# Patient Record
Sex: FEMALE | Race: ASIAN | Employment: OTHER | URBAN - METROPOLITAN AREA
[De-identification: names, ages, dates, MRNs, and addresses within clinical notes are randomized per-mention and may not be internally consistent; named-entity substitution may affect disease eponyms.]

---

## 2017-11-30 PROBLEM — M85.80 OSTEOPENIA, UNSPECIFIED LOCATION: Status: ACTIVE | Noted: 2017-11-30

## 2017-12-05 PROCEDURE — 81003 URINALYSIS AUTO W/O SCOPE: CPT | Performed by: INTERNAL MEDICINE

## 2019-02-07 ENCOUNTER — APPOINTMENT (OUTPATIENT)
Dept: GENERAL RADIOLOGY | Facility: HOSPITAL | Age: 69
DRG: 087 | End: 2019-02-07
Attending: EMERGENCY MEDICINE
Payer: MEDICARE

## 2019-02-07 ENCOUNTER — APPOINTMENT (OUTPATIENT)
Dept: CT IMAGING | Facility: HOSPITAL | Age: 69
DRG: 087 | End: 2019-02-07
Attending: EMERGENCY MEDICINE
Payer: MEDICARE

## 2019-02-07 ENCOUNTER — HOSPITAL ENCOUNTER (INPATIENT)
Facility: HOSPITAL | Age: 69
LOS: 1 days | Discharge: HOME | DRG: 087 | End: 2019-02-08
Attending: EMERGENCY MEDICINE | Admitting: HOSPITALIST
Payer: MEDICARE

## 2019-02-07 DIAGNOSIS — S09.90XA INJURY OF HEAD, INITIAL ENCOUNTER: ICD-10-CM

## 2019-02-07 DIAGNOSIS — S06.5X9A ACUTE SUBDURAL HEMATOMA (HCC): Primary | ICD-10-CM

## 2019-02-07 LAB
ALBUMIN SERPL-MCNC: 3.7 G/DL (ref 3.1–4.5)
ALBUMIN/GLOB SERPL: 1.2 {RATIO} (ref 1–2)
ALP LIVER SERPL-CCNC: 110 U/L (ref 55–142)
ALT SERPL-CCNC: 36 U/L (ref 14–54)
ANION GAP SERPL CALC-SCNC: 7 MMOL/L (ref 0–18)
APTT PPP: 33.8 SECONDS (ref 26.1–34.6)
AST SERPL-CCNC: 32 U/L (ref 15–41)
ATRIAL RATE: 74 BPM
BASOPHILS # BLD AUTO: 0.06 X10(3) UL (ref 0–0.2)
BASOPHILS NFR BLD AUTO: 0.9 %
BILIRUB SERPL-MCNC: 0.6 MG/DL (ref 0.1–2)
BUN BLD-MCNC: 11 MG/DL (ref 8–20)
BUN/CREAT SERPL: 18.6 (ref 10–20)
CALCIUM BLD-MCNC: 8.3 MG/DL (ref 8.3–10.3)
CHLORIDE SERPL-SCNC: 102 MMOL/L (ref 101–111)
CO2 SERPL-SCNC: 30 MMOL/L (ref 22–32)
CREAT BLD-MCNC: 0.59 MG/DL (ref 0.55–1.02)
DEPRECATED RDW RBC AUTO: 38.8 FL (ref 35.1–46.3)
EOSINOPHIL # BLD AUTO: 0.09 X10(3) UL (ref 0–0.7)
EOSINOPHIL NFR BLD AUTO: 1.3 %
ERYTHROCYTE [DISTWIDTH] IN BLOOD BY AUTOMATED COUNT: 11.8 % (ref 11–15)
GLOBULIN PLAS-MCNC: 3.1 G/DL (ref 2.8–4.4)
GLUCOSE BLD-MCNC: 117 MG/DL (ref 70–99)
HCT VFR BLD AUTO: 41.9 % (ref 35–48)
HGB BLD-MCNC: 14.2 G/DL (ref 12–16)
IMM GRANULOCYTES # BLD AUTO: 0.03 X10(3) UL (ref 0–1)
IMM GRANULOCYTES NFR BLD: 0.4 %
INR BLD: 0.93 (ref 0.9–1.1)
LYMPHOCYTES # BLD AUTO: 1.77 X10(3) UL (ref 1–4)
LYMPHOCYTES NFR BLD AUTO: 25.2 %
M PROTEIN MFR SERPL ELPH: 6.8 G/DL (ref 6.4–8.2)
MCH RBC QN AUTO: 30.5 PG (ref 26–34)
MCHC RBC AUTO-ENTMCNC: 33.9 G/DL (ref 31–37)
MCV RBC AUTO: 89.9 FL (ref 80–100)
MONOCYTES # BLD AUTO: 0.41 X10(3) UL (ref 0.1–1)
MONOCYTES NFR BLD AUTO: 5.8 %
NEUTROPHILS # BLD AUTO: 4.67 X10 (3) UL (ref 1.5–7.7)
NEUTROPHILS # BLD AUTO: 4.67 X10(3) UL (ref 1.5–7.7)
NEUTROPHILS NFR BLD AUTO: 66.4 %
OSMOLALITY SERPL CALC.SUM OF ELEC: 288 MOSM/KG (ref 275–295)
P AXIS: 63 DEGREES
P-R INTERVAL: 152 MS
PLATELET # BLD AUTO: 350 10(3)UL (ref 150–450)
POTASSIUM SERPL-SCNC: 3.2 MMOL/L (ref 3.6–5.1)
PSA SERPL DL<=0.01 NG/ML-MCNC: 12.9 SECONDS (ref 12.4–14.7)
Q-T INTERVAL: 386 MS
QRS DURATION: 78 MS
QTC CALCULATION (BEZET): 428 MS
R AXIS: 89 DEGREES
RBC # BLD AUTO: 4.66 X10(6)UL (ref 3.8–5.3)
SODIUM SERPL-SCNC: 139 MMOL/L (ref 136–144)
T AXIS: 72 DEGREES
TROPONIN I SERPL-MCNC: <0.046 NG/ML (ref ?–0.05)
TSI SER-ACNC: 1.23 MIU/ML (ref 0.35–5.5)
VENTRICULAR RATE: 74 BPM
WBC # BLD AUTO: 7 X10(3) UL (ref 4–11)

## 2019-02-07 PROCEDURE — 99291 CRITICAL CARE FIRST HOUR: CPT | Performed by: INTERNAL MEDICINE

## 2019-02-07 PROCEDURE — 95816 EEG AWAKE AND DROWSY: CPT | Performed by: OTHER

## 2019-02-07 PROCEDURE — 99221 1ST HOSP IP/OBS SF/LOW 40: CPT | Performed by: NEUROLOGICAL SURGERY

## 2019-02-07 PROCEDURE — 70450 CT HEAD/BRAIN W/O DYE: CPT | Performed by: EMERGENCY MEDICINE

## 2019-02-07 PROCEDURE — 71045 X-RAY EXAM CHEST 1 VIEW: CPT | Performed by: EMERGENCY MEDICINE

## 2019-02-07 PROCEDURE — 72125 CT NECK SPINE W/O DYE: CPT | Performed by: EMERGENCY MEDICINE

## 2019-02-07 RX ORDER — ONDANSETRON 2 MG/ML
4 INJECTION INTRAMUSCULAR; INTRAVENOUS EVERY 4 HOURS PRN
Status: DISCONTINUED | OUTPATIENT
Start: 2019-02-07 | End: 2019-02-08

## 2019-02-07 RX ORDER — SODIUM CHLORIDE 9 MG/ML
INJECTION, SOLUTION INTRAVENOUS CONTINUOUS
Status: ACTIVE | OUTPATIENT
Start: 2019-02-07 | End: 2019-02-07

## 2019-02-07 RX ORDER — ACETAMINOPHEN 500 MG
500 TABLET ORAL EVERY 6 HOURS PRN
Status: DISCONTINUED | OUTPATIENT
Start: 2019-02-07 | End: 2019-02-08

## 2019-02-07 RX ORDER — LEVETIRACETAM 500 MG/1
500 TABLET ORAL 2 TIMES DAILY
Status: DISCONTINUED | OUTPATIENT
Start: 2019-02-07 | End: 2019-02-08

## 2019-02-07 RX ORDER — ACETAMINOPHEN 500 MG
1000 TABLET ORAL EVERY 6 HOURS PRN
Status: DISCONTINUED | OUTPATIENT
Start: 2019-02-07 | End: 2019-02-08

## 2019-02-07 RX ORDER — ONDANSETRON 2 MG/ML
4 INJECTION INTRAMUSCULAR; INTRAVENOUS ONCE
Status: COMPLETED | OUTPATIENT
Start: 2019-02-07 | End: 2019-02-07

## 2019-02-07 NOTE — CONSULTS
Sarah  Neurocritical Care Consult Note    Vinny Villagomez Patient Status:  Inpatient    1950 MRN [id number]   [facility] 6NE-A Attending Lang Khanna MD   Hosp Day # 0 PCP Annie Singleton MD       Reason for L2 fracture complicated by pneumonia   • Cancer Mother         breast cancer   • Breast Cancer Mother         dx age- early 29's   • Arthritis Sister         rheumatoid arthritis       Current Meds:    Current Facility-Administered Medications:  0.9 used. Dose information is transmitted to the ACR ([facility]) [address]) which includes the Dose Index Registry.   PATIENT STATED HISTORY: (As transcribed by Technologist)  The patient fell backwards down 5 ce stairs at [employer]. She doesn't remember what happened. FINDINGS: Unremarkable alignment of the cervical spine. Vertebral body heights are maintained throughout the cervical spine. Disc spaces are maintained throughout the cervical spine.  No evidence leukocytosis  Endocrine:  Monitor glucose, sliding scale insulin prn  DVT Prophylaxis:  SCD’s    Goals of the Day: neurochecks, hct, eeg   A total of 45 minutes of critical care time (exclusive of billable procedures) was administered to manage and/or prev

## 2019-02-07 NOTE — CONSULTS
[facility]  Neurosurgery Consult    Kika [place] Patient Status:  Inpatient    1950 MRN [id number]   [facility] 6NE-A Attending Emilie Beltran MD   Hosp Day # 0 PCP Eva Anguiano MD     REASON FOR CONSULTATION:  SDH    HISTOR SIGNS: /62   Pulse 82   Temp 98.8 °F (37.1 °C) (Temporal)   Resp 18   Ht 5' 4\" (1.626 m)   Wt 120 lb (54.4 kg)   SpO2 100%   BMI 20.60 kg/m²   GENERAL:  Patient is a 78year old female in no acute distress. HEENT:  Normocephalic, atraumatic.   NEURO year old female with a left SDH after a fall    Plan:  Discussed with  on rounds  Repeat CT in 12 hours or sooner if pt clinically declines  Q 1 neuro checks for now  No aspirin or anticoagulation until cleared by neurosurgery  PT/OT consults

## 2019-02-07 NOTE — PROCEDURES
[facility] - ELECTROENCEPHALOGRAM (EEG) REPORT  Patient Name:  Donna Wilson   MRN / CSN:  [id number] / [id number]   Date of Birth / Age:  1/18/1950 /  78year old   Encounter Date:  2/7/19         METHODS:  Twenty-two electrodes were applied according to the 10-20

## 2019-02-07 NOTE — ED PROVIDER NOTES
Patient Seen in: [facility] Emergency Department    History   Patient presents with:  Fall (musculoskeletal, neurologic)    Stated Complaint: fall    HPI    39-year-old female comes to the hospital with a complaint of having fallen at [employer]. kg   SpO2 96%   BMI 20.60 kg/m²         Physical Exam    HEENT : NCAT, EOMI, PEERL, throat clear, neck in c-collar but no significant tenderness noted, no JVD, trachea midline, No LAD  Heart: S1S2 normal. No murmurs, regular rate and rhythm, nontender  [facility] Data Registry) which includes the Dose Index Registry. PATIENT STATED HISTORY: (As transcribed by Technologist)  The patient fell backwards down 5 cement stairs at [facility]. She has a bump on the back of her head.  She doesn't remember what ha the cervical spine. Vertebral body heights are maintained throughout the cervical spine. Disc spaces are maintained throughout the cervical spine. No evidence of fracture or dislocation. Prevertebral soft tissues are within normal limits.  Predental space i further care for her injury as above as well as correction of her potassium levels  Admission disposition: 2/7/2019 10:15 AM                 Disposition and Plan     Clinical Impression:  Acute subdural hematoma ([address]  (primary encounter diagnosis)  Injury

## 2019-02-07 NOTE — H&P
[facility] Hospitalist History and Physical      CC: Fall, SDH    PCP: Soila Minor MD    History of Present Illness: Patient is a 78year old female with PMH sig for HL not on medication presents after fall on the stairs at [employer] today.  She reports that edema  Skin: Skin warm and dry. No rashes or lesions. Psych: AAO x 3, with appropriate affect  Neuro: no focal neuro deficits on exam        Data Review:    Labs:   Lab Results   Component Value Date    WBC 7.0 02/07/2019    HGB 14.2 02/07/2019    HCT 41. the orbits are unremarkable. IMPRESSION:  4 mm subdural hemorrhage surrounding the left cerebral hemisphere without significant mass effect is noted. There may be minimal subarachnoid hemorrhage as well. No adjacent fracture is noted.      This critical

## 2019-02-08 ENCOUNTER — APPOINTMENT (OUTPATIENT)
Dept: CV DIAGNOSTICS | Facility: HOSPITAL | Age: 69
DRG: 087 | End: 2019-02-08
Attending: HOSPITALIST
Payer: MEDICARE

## 2019-02-08 VITALS
WEIGHT: 119.06 LBS | HEART RATE: 83 BPM | BODY MASS INDEX: 20.32 KG/M2 | TEMPERATURE: 98 F | OXYGEN SATURATION: 96 % | DIASTOLIC BLOOD PRESSURE: 65 MMHG | SYSTOLIC BLOOD PRESSURE: 113 MMHG | HEIGHT: 64 IN | RESPIRATION RATE: 21 BRPM

## 2019-02-08 LAB
ANION GAP SERPL CALC-SCNC: 7 MMOL/L (ref 0–18)
BASOPHILS # BLD AUTO: 0.05 X10(3) UL (ref 0–0.2)
BASOPHILS NFR BLD AUTO: 0.6 %
BUN BLD-MCNC: 8 MG/DL (ref 8–20)
BUN/CREAT SERPL: 15.4 (ref 10–20)
CALCIUM BLD-MCNC: 8.3 MG/DL (ref 8.3–10.3)
CHLORIDE SERPL-SCNC: 108 MMOL/L (ref 101–111)
CO2 SERPL-SCNC: 27 MMOL/L (ref 22–32)
CREAT BLD-MCNC: 0.52 MG/DL (ref 0.55–1.02)
DEPRECATED RDW RBC AUTO: 39.6 FL (ref 35.1–46.3)
EOSINOPHIL # BLD AUTO: 0.06 X10(3) UL (ref 0–0.7)
EOSINOPHIL NFR BLD AUTO: 0.7 %
ERYTHROCYTE [DISTWIDTH] IN BLOOD BY AUTOMATED COUNT: 11.9 % (ref 11–15)
GLUCOSE BLD-MCNC: 98 MG/DL (ref 70–99)
HCT VFR BLD AUTO: 39.2 % (ref 35–48)
HGB BLD-MCNC: 13.6 G/DL (ref 12–16)
IMM GRANULOCYTES # BLD AUTO: 0.02 X10(3) UL (ref 0–1)
IMM GRANULOCYTES NFR BLD: 0.2 %
LYMPHOCYTES # BLD AUTO: 1.25 X10(3) UL (ref 1–4)
LYMPHOCYTES NFR BLD AUTO: 14.6 %
MCH RBC QN AUTO: 31.4 PG (ref 26–34)
MCHC RBC AUTO-ENTMCNC: 34.7 G/DL (ref 31–37)
MCV RBC AUTO: 90.5 FL (ref 80–100)
MONOCYTES # BLD AUTO: 0.53 X10(3) UL (ref 0.1–1)
MONOCYTES NFR BLD AUTO: 6.2 %
NEUTROPHILS # BLD AUTO: 6.66 X10 (3) UL (ref 1.5–7.7)
NEUTROPHILS # BLD AUTO: 6.66 X10(3) UL (ref 1.5–7.7)
NEUTROPHILS NFR BLD AUTO: 77.7 %
OSMOLALITY SERPL CALC.SUM OF ELEC: 292 MOSM/KG (ref 275–295)
PLATELET # BLD AUTO: 292 10(3)UL (ref 150–450)
POTASSIUM SERPL-SCNC: 3.7 MMOL/L (ref 3.6–5.1)
RBC # BLD AUTO: 4.33 X10(6)UL (ref 3.8–5.3)
SODIUM SERPL-SCNC: 142 MMOL/L (ref 136–144)
WBC # BLD AUTO: 8.6 X10(3) UL (ref 4–11)

## 2019-02-08 PROCEDURE — 99231 SBSQ HOSP IP/OBS SF/LOW 25: CPT | Performed by: NEUROLOGICAL SURGERY

## 2019-02-08 PROCEDURE — 93306 TTE W/DOPPLER COMPLETE: CPT | Performed by: HOSPITALIST

## 2019-02-08 PROCEDURE — 99291 CRITICAL CARE FIRST HOUR: CPT | Performed by: INTERNAL MEDICINE

## 2019-02-08 RX ORDER — LEVETIRACETAM 500 MG/1
500 TABLET ORAL 2 TIMES DAILY
Qty: 12 TABLET | Refills: 0 | Status: SHIPPED | OUTPATIENT
Start: 2019-02-08 | End: 2019-02-14

## 2019-02-08 NOTE — DISCHARGE SUMMARY
General Medicine Discharge Summary     Patient ID:  Renate Gamble  78year old  1/18/1950    Admit date: 2/7/2019    Discharge date and time:  2/8/19    Attending Physician: No att. providers found     P looks OK  - Suspect mechanical in nature- seen by PT/OT     # SDH  - Denies use of any blood thinners  - Per NSGY  - Refused repeat CT scan last evening- symptoms stable today. DC per NSGY with FU 1 month.   - Continue Keppra on DC    # HL: no longer on med

## 2019-02-08 NOTE — PROGRESS NOTES
[facility] [facility]  Neurocritical Care Progress Note     Mariluz Nichols Patient Status:  Inpatient    1950 MRN [id number]   [facility] 6NE-A Attending Carley Loera MD   Hosp Day # 1 PCP Sarah Hamilton MD     Subjective: diff includes cardiogenic vs neurogenic vs iatrogenic from her several alternative medicine supplements. Will cont tele. EEG neg.    Cardiac:  SBP goal 100-160, check TTE  Pulmonary:  Stable on RA  Renal:  IVFs, monitor BUN/Cr  GI:  PO as tolerated  Heme/ID

## 2019-02-08 NOTE — PROGRESS NOTES
[facility]  Neurosurgery Progress Note    Safia Carrasquillo Patient Status:  Inpatient    1950 MRN [id number]   [facility] 6NE-A Attending Chase Tomlin MD   Hosp Day # 1 PCP Moncho Hernandez MD     Subjective:  Pt examined, noted t month with head ct  Instructions given

## 2019-02-08 NOTE — CM/SW NOTE
Stephen met with pt. She was admitted following a fall at [facility]. Pt has Lsdh- she has refused follow up ct and is being dc'd. Pt states she lives alone.   She has called [facility] at [facility] and they agreed to transport pt back there

## 2019-02-08 NOTE — OCCUPATIONAL THERAPY NOTE
OCCUPATIONAL THERAPY EVALUATION - INPATIENT     Room Number: 5942/7762-D  Evaluation Date: 2/8/2019  Type of Evaluation: Initial  Presenting Problem: SDH    Physician Order: IP Consult to Occupational Therapy  Reason for Therapy: ADL/IADL Dysfunction and D toilet  Shower/Tub and Equipment: Tub-shower combo  Other Equipment: Sock aid    Occupation/Status: not working  Hand Dominance: Right  Drives: Yes  Patient Regularly Uses: None    Prior Level of Function: Pt normally lives in a 2 level home alone.   She is ACTIVITY TOLERANCE                         O2 SATURATIONS                ACTIVITIES OF DAILY LIVING ASSESSMENT  AM-PAC ‘6-Clicks’ Inpatient Daily Activity Short Form  How much help from another person does the patient currently need…  -   Putting on an medication management, IADLs, money management and community re-entry. Clock drawing completed. Pt able to draw full [place] and place numbers. Inconsistent spacing and number location noted. This can indicate impaired attention and spatial orientation.

## 2019-02-08 NOTE — PROGRESS NOTES
Informed by RN that patient is refusing repeat CTB scheduled for tonight. Per documentation, patient was also refusing earlier today. Neuro [facility] updated.     Dulce Moran, APRN  Critical Care NP  [address] [phone]: [phone]  Pgr: [phone]

## 2019-02-08 NOTE — PLAN OF CARE
NEUROLOGICAL - ADULT    • Achieves stable or improved neurological status Adequate for Discharge        Patient/Family Goals    • Patient/Family Long Term Goal Adequate for Discharge    • Patient/Family Short Term Goal Adequate for Discharge        Assumed

## 2019-02-08 NOTE — PLAN OF CARE
Patient VSS. Neuro intact with no change in baseline. Patient continues to refuse CT, able to walk to bathroom with assist.  Plan of care updated,  Will continue to monitor.

## 2019-02-08 NOTE — PHYSICAL THERAPY NOTE
PHYSICAL THERAPY QUICK EVALUATION - INPATIENT    Room Number: 8134/0371-B  Evaluation Date: 2/8/2019  Presenting Problem: s/p fall, L SDH  Physician Order: PT Eval and Treat    Problem List  Principal Problem:    Acute subdural hematoma (HCC)  Active Pro bedside commode, etc.): None   -   Moving from lying on back to sitting on the side of the bed?: None   How much help from another person does the patient currently need. ..   -   Moving to and from a bed to a chair (including a wheelchair)?: None   -   Astrid Quant evaluated and presents with no skilled Physical Therapy needs at this time. Patient discharged from Physical Therapy services. Please re-order if a new functional limitation presents during this admission.     GOALS  Patient was able to achieve the follow

## 2019-02-13 NOTE — PAYOR COMM NOTE
--------------  ADMISSION REVIEW     Payor: [address] [place] #:  [id number]  Authorization Number: [id number]    Admit date: 2/7/19  Admit time: 8931       Patient Seen in: [facility] Emergency Department    History   Stated Compl is noted. This critical result was discussed with Dr. Christophe Nichole at 8541 hr on 2/7/2019. Ct Spine Cervical (cpt=72125)     CT SPINE CERVICAL- NEG. The patient fell backward down 5 cement stairs at [employer]. She doesn't remember what happened.      CXR:  NEG 02/07/2019    TP 6.8 02/07/2019    AST 32 02/07/2019    ALT 36 02/07/2019    PTT 33.8 02/07/2019    INR 0.93 02/07/2019    PTP 12.9 02/07/2019    TROP <0.046 02/07/2019     Assessment/Plan:   Principal Problem:    Acute subdural hematoma (HCC)  Active Prob for SDH.

## 2019-02-15 ENCOUNTER — TELEPHONE (OUTPATIENT)
Dept: SURGERY | Facility: CLINIC | Age: 69
End: 2019-02-15

## 2019-02-15 DIAGNOSIS — S06.5X9A ACUTE SUBDURAL HEMATOMA (HCC): Primary | ICD-10-CM

## 2019-02-15 NOTE — TELEPHONE ENCOUNTER
LMTCB to informed patient of below information. Also informed patient to check [employer] for below information. Information sent to patient via [employer].

## 2019-02-15 NOTE — TELEPHONE ENCOUNTER
patient indicates that Dr. North Florez put order in for OT Cog,neuro. Patient called [facility] and they do not do this. Can you tell me where to go to have this done?

## 2019-02-15 NOTE — TELEPHONE ENCOUNTER
Will need to verify with  if neurocognitive eval can be done by neuropsych or behavorial therapist.    Avel Zuniga pending for neuropsych eval with neurocognitive therapy.

## 2019-03-14 ENCOUNTER — OFFICE VISIT (OUTPATIENT)
Dept: SURGERY | Facility: CLINIC | Age: 69
End: 2019-03-14
Payer: COMMERCIAL

## 2019-03-14 VITALS
WEIGHT: 115 LBS | HEIGHT: 64 IN | SYSTOLIC BLOOD PRESSURE: 115 MMHG | BODY MASS INDEX: 19.63 KG/M2 | HEART RATE: 88 BPM | DIASTOLIC BLOOD PRESSURE: 72 MMHG

## 2019-03-14 DIAGNOSIS — S06.5X9A ACUTE SUBDURAL HEMATOMA (HCC): Primary | ICD-10-CM

## 2019-03-14 PROCEDURE — 99213 OFFICE O/P EST LOW 20 MIN: CPT | Performed by: PHYSICIAN ASSISTANT

## 2019-03-14 NOTE — PROGRESS NOTES
Pt is here for hospital follow up for Acute subdural hematoma. Pt states that she has been doing well since hospital visit. Pt states that she has no symptome to be reported.

## 2019-03-14 NOTE — PROGRESS NOTES
Neurosurgery Clinic Visit  3/14/2019    Nestor Monico Hammonds PCP:  Sarah Hamilton MD    1950 MRN [id number]     HISTORY OF PRESENT ILLNESS:  Nestor Hammonds is a(n) 78year old female who is here for hospital follow-up for subdural hematoma.   She contin

## 2019-03-30 ENCOUNTER — HOSPITAL ENCOUNTER (OUTPATIENT)
Dept: CT IMAGING | Facility: HOSPITAL | Age: 69
Discharge: HOME | DRG: 087 | End: 2019-03-30
Attending: NURSE PRACTITIONER
Payer: MEDICARE

## 2019-03-30 ENCOUNTER — HOSPITAL ENCOUNTER (INPATIENT)
Facility: HOSPITAL | Age: 69
LOS: 1 days | Discharge: HOME | DRG: 087 | End: 2019-03-31
Attending: EMERGENCY MEDICINE | Admitting: HOSPITALIST
Payer: MEDICARE

## 2019-03-30 DIAGNOSIS — I62.00 SUBDURAL HEMORRHAGE (HCC): Primary | ICD-10-CM

## 2019-03-30 DIAGNOSIS — S06.5X9A ACUTE SUBDURAL HEMATOMA (HCC): ICD-10-CM

## 2019-03-30 PROCEDURE — 36415 COLL VENOUS BLD VENIPUNCTURE: CPT

## 2019-03-30 PROCEDURE — 85610 PROTHROMBIN TIME: CPT | Performed by: EMERGENCY MEDICINE

## 2019-03-30 PROCEDURE — 70450 CT HEAD/BRAIN W/O DYE: CPT | Performed by: NURSE PRACTITIONER

## 2019-03-30 PROCEDURE — 99285 EMERGENCY DEPT VISIT HI MDM: CPT

## 2019-03-30 PROCEDURE — 80053 COMPREHEN METABOLIC PANEL: CPT | Performed by: EMERGENCY MEDICINE

## 2019-03-30 PROCEDURE — 85025 COMPLETE CBC W/AUTO DIFF WBC: CPT | Performed by: EMERGENCY MEDICINE

## 2019-03-30 PROCEDURE — 85730 THROMBOPLASTIN TIME PARTIAL: CPT | Performed by: EMERGENCY MEDICINE

## 2019-03-30 RX ORDER — ACETAMINOPHEN 325 MG/1
650 TABLET ORAL EVERY 6 HOURS PRN
Status: DISCONTINUED | OUTPATIENT
Start: 2019-03-30 | End: 2019-03-31

## 2019-03-30 NOTE — ED PROVIDER NOTES
Patient Seen in: [facility] Emergency Department    History   Patient presents with:  Abnormal Result (metabolic, cardiac)    Stated Complaint: here for followup outpatient CT s/p subdural- criticial finding    HPI    69-year-old female presents to Conjunctivae show no pallor. Oropharynx clear, mucous membranes moist   Neck: supple, no rigidity   Lungs: good air exchange and clear   Heart: regular rate rhythm and no murmur   Abdomen: Soft and nontender. No abdominal masses.   No peritoneal signs   E the left frontal and parietal cortical sulci. Mild mass effect on the left lateral ventricle which appears slightly effaced. INTRACRANIAL:  Interval increase in size of the left subdural hematoma.   This now measures up to 1.8 cm in diameter (series 4, pat pm    Follow-up:  No follow-up provider specified. Medications Prescribed:  There are no discharge medications for this patient.       Present on Admission  Date Reviewed: 3/14/2019          ICD-10-CM Noted POA    Subdural hemorrhage ([facility] [address] I62.00 3/30/

## 2019-03-30 NOTE — ED INITIAL ASSESSMENT (HPI)
Pt reports fall in Feb 7th, had CT which showed bleeding in brain - repeat CT today showed increasing bleeding w/ shift. Denies headache, weakness, difficulty speaking.

## 2019-03-31 VITALS
WEIGHT: 115 LBS | DIASTOLIC BLOOD PRESSURE: 55 MMHG | TEMPERATURE: 98 F | OXYGEN SATURATION: 98 % | HEIGHT: 64 IN | SYSTOLIC BLOOD PRESSURE: 123 MMHG | RESPIRATION RATE: 17 BRPM | BODY MASS INDEX: 19.63 KG/M2 | HEART RATE: 81 BPM

## 2019-03-31 PROCEDURE — 85025 COMPLETE CBC W/AUTO DIFF WBC: CPT | Performed by: HOSPITALIST

## 2019-03-31 PROCEDURE — 80048 BASIC METABOLIC PNL TOTAL CA: CPT | Performed by: HOSPITALIST

## 2019-03-31 RX ORDER — SODIUM CHLORIDE 9 MG/ML
INJECTION, SOLUTION INTRAVENOUS CONTINUOUS
Status: DISCONTINUED | OUTPATIENT
Start: 2019-03-31 | End: 2019-03-31

## 2019-03-31 NOTE — H&P
[facility] Hospitalist History and Physical/Discharge Summary      CC:  Fall with head trauma     PCP: Mikki Vazquez MD      History of Present Illness: Patient is a 78year old female with PMH sig for HLD, osteopenia, and vit D deficiency who presented to t EOMs intact. Nose: Nares normal. Septum midline. Mucosa normal. No drainage.    Throat: Lips, mucosa, and tongue normal. Teeth and gums normal.   Neck: Supple, symmetrical, trachea midline, no cervical or supraclavicular lymph adenopathy, thyroid: no enl Technologist)  Prior history of subdural hematoma. FINDINGS:  VENTRICLES/SULCI:  Asymmetric sulcal effacement of the left frontal and parietal cortical sulci. Mild mass effect on the left lateral ventricle which appears slightly effaced.  INTRACRANIAL: Blaise Newton DO  [facility] Hospitalist      **Certification      PHYSICIAN Certification of Need for Inpatient Hospitalization - Initial Certification    Patient will require inpatient services that will reasonably be expected to span two midnight's based

## 2019-03-31 NOTE — PLAN OF CARE
Received pt a/ox4, RA, NSR on tele at 0700  Pt anxious and a little impulsive, repeats questions, can be forgetful.   Neuros checks Q3, no deficits noted   Pt up ad esther in room  Pt has bandages on chest and left arm, stated they were keloids and refused to

## 2019-03-31 NOTE — PLAN OF CARE
Assumed care of patient at 1900. Alert and oriented x4. Patient impulsive and states she is anxious regarding her current health state, but no other neuro deficits noted. SR on tele. VSS. Up ad esther in room.  Spoke to Dr. Joseph Weiss with whom arti to make her NP

## 2019-03-31 NOTE — PAYOR COMM NOTE
--------------  ADMISSION REVIEW     Payor: [facility] [address] [place] #:  [id number]  Authorization Number: N/A    Admit date: 3/30/19  Admit time: Bethany Partida - [facility]       Admitting Physician: Fatoumata Gonzalez MD  Attending Physician:  Alec Chin complaint: here for followup outpatient CT s/p subdural- criticial finding  Other systems are as noted in HPI. Constitutional and vital signs reviewed. All other systems reviewed and negative except as noted above.     Physical Exam     ED Triage [place] 3/30/2019  PROCEDURE:  CT BRAIN OR HEAD (95073)  COMPARISON:  NEHEMIAH , CT BRAIN OR HEAD (46595), 2/07/2019, 8:41.   INDICATIONS:  S06.5X9A Traumatic subdural hemorrhage with loss of consciousness of unspecified duration, initial encounter  TECHNIQUE:  Nonco MDM   Patient sent to the emergency room after repeat CT scan for evaluation of left subdural hematoma shows interval increase in size. She currently has a subdural hematoma measuring 1.8 cm at its maximum diameter.   There is approximately 3 mm of • HYPERLIPIDEMIA    • Hyperlipidemia       Past Surgical History:   Procedure Laterality Date   • BENIGN BIOPSY LEFT  1994    Fibroadenoma   • BIOPSY OF BREAST, INCISIONAL     • COLONOSCOPY N/A 6/6/2013    Performed by Chuy Junior MD at [address] [place] [address] turgor normal. No rashes or lesions.     Neurologic: Normal strength, no focal deficit appreciated     Data Review:    LABS:   Lab Results   Component Value Date    WBC 7.0 03/31/2019    HGB 13.4 03/31/2019    HCT 38.6 03/31/2019    .0 03/31/2019 parenchyma. SINUSES:           No sign of acute sinusitis. MASTOIDS:          No sign of acute inflammation. SKULL:             No evidence for fracture or osseous abnormality. OTHER:             None.       CONCLUSION:  Interval increase in size of the l staff assess  Pt NPO possible procedure with Dr. Raz Mcdaniel today  Dr. Raz Mcdaniel in room for quite some time, going over CT results and explaining need for surgery.   Pt stated she would like surgery than a friend called and she told me she did not feel comfortable

## 2019-03-31 NOTE — PLAN OF CARE
NURSING ADMISSION NOTE      Patient admitted via Cart  Oriented to room. Safety precautions initiated. Bed in low position. Call light in reach.     Patient admitted from ER  Admission navigator and care endorsed to oncoming RN

## 2019-08-12 ENCOUNTER — MED REC SCAN ONLY (OUTPATIENT)
Dept: SURGERY | Facility: CLINIC | Age: 69
End: 2019-08-12

## 2020-02-12 ENCOUNTER — HOSPITAL ENCOUNTER (OUTPATIENT)
Dept: CT IMAGING | Facility: HOSPITAL | Age: 70
Discharge: HOME | End: 2020-02-12
Attending: INTERNAL MEDICINE
Payer: MEDICARE

## 2020-02-12 DIAGNOSIS — S06.5X9A ACUTE SUBDURAL HEMATOMA (HCC): ICD-10-CM

## 2020-02-12 PROCEDURE — 70450 CT HEAD/BRAIN W/O DYE: CPT | Performed by: INTERNAL MEDICINE

## 2020-02-18 ENCOUNTER — TELEPHONE (OUTPATIENT)
Dept: SURGERY | Facility: CLINIC | Age: 70
End: 2020-02-18

## 2021-10-22 PROBLEM — I62.00 SUBDURAL HEMORRHAGE (HCC): Status: RESOLVED | Noted: 2019-03-30 | Resolved: 2021-10-22

## 2021-10-22 PROBLEM — S06.5X9A ACUTE SUBDURAL HEMATOMA (HCC): Status: RESOLVED | Noted: 2019-02-07 | Resolved: 2021-10-22

## 2021-10-22 PROBLEM — Z86.79 HISTORY OF SUBDURAL HEMATOMA: Status: ACTIVE | Noted: 2021-10-22

## (undated) NOTE — LETTER
Renu Guzman [address] [facility]  [place], [address] [facility]    Consent for Operation  Date: _____3/31/19_____________                                Time: ___1145____________    1.  I authorize the performance upon Juan Pablo Hammonds the following operation: videotape. The [facility] will not be responsible for storage or maintenance of this tape. 6. For the purpose of advancing medical education, I consent to the admittance of observers to the Operating Room.   7. I authorize the use of any specimen, organs, ti When the patient is a minor or mentally incompetent to give consent:  Signature of person authorized to consent for patient: ___________________________   Relationship to patient: ____________________________________________________    Signature of Witness these medicines may increase my risk of anesthetic complications. iv. If I am allergic to anything or have had a reaction to anesthesia before. 3. I understand how the anesthesia medicine will help me (benefits).   4. I understand that with any type of an patient’s representative) and answered their questions. The patient or their representative has agreed to have anesthesia services.     _____________________________________________________________________________  Witness        Date   Time  I have ruben

## (undated) NOTE — LETTER
Renu Guzman [address] [place] [address] E  [place], [address] [facility]    Consent for Operation  Date: _____3/31/19_____________                                Time: _____1145__________    1.  I authorize the performance upon Juan Pablo Hammonds the following operation: procedure has been videotaped, the surgeon will obtain the original videotape. The hospital will not be responsible for storage or maintenance of this tape.   6. For the purpose of advancing medical education, I consent to the admittance of observers to the STATEMENTS REQUIRING INSERTION OR COMPLETION WERE FILLED IN.     Signature of Patient:   ___________________________    When the patient is a minor or mentally incompetent to give consent:  Signature of person authorized to consent for patient: ____________ drugs/illegal medications). Failure to inform my anesthesiologist about these medicines may increase my risk of anesthetic complications. iv. If I am allergic to anything or have had a reaction to anesthesia before.   3. I understand how the anesthesia med I have discussed the procedure and information above with the patient (or patient’s representative) and answered their questions. The patient or their representative has agreed to have anesthesia services.     _______________________________________________